# Patient Record
Sex: MALE
[De-identification: names, ages, dates, MRNs, and addresses within clinical notes are randomized per-mention and may not be internally consistent; named-entity substitution may affect disease eponyms.]

---

## 2021-05-10 ENCOUNTER — APPOINTMENT (OUTPATIENT)
Dept: DISASTER EMERGENCY | Facility: OTHER | Age: 31
End: 2021-05-10
Payer: COMMERCIAL

## 2021-05-10 PROCEDURE — 0012A: CPT

## 2022-09-19 PROBLEM — Z00.00 ENCOUNTER FOR PREVENTIVE HEALTH EXAMINATION: Status: ACTIVE | Noted: 2022-09-19

## 2024-03-06 ENCOUNTER — APPOINTMENT (OUTPATIENT)
Dept: OTOLARYNGOLOGY | Facility: CLINIC | Age: 34
End: 2024-03-06
Payer: COMMERCIAL

## 2024-03-06 VITALS
DIASTOLIC BLOOD PRESSURE: 71 MMHG | HEART RATE: 68 BPM | SYSTOLIC BLOOD PRESSURE: 108 MMHG | TEMPERATURE: 98 F | WEIGHT: 160 LBS | HEIGHT: 72 IN | BODY MASS INDEX: 21.67 KG/M2 | OXYGEN SATURATION: 100 %

## 2024-03-06 DIAGNOSIS — H93.299 OTHER ABNORMAL AUDITORY PERCEPTIONS, UNSPECIFIED EAR: ICD-10-CM

## 2024-03-06 DIAGNOSIS — J34.2 DEVIATED NASAL SEPTUM: ICD-10-CM

## 2024-03-06 DIAGNOSIS — Z78.9 OTHER SPECIFIED HEALTH STATUS: ICD-10-CM

## 2024-03-06 DIAGNOSIS — H61.23 IMPACTED CERUMEN, BILATERAL: ICD-10-CM

## 2024-03-06 PROCEDURE — 92550 TYMPANOMETRY & REFLEX THRESH: CPT | Mod: 52

## 2024-03-06 PROCEDURE — 69210 REMOVE IMPACTED EAR WAX UNI: CPT

## 2024-03-06 PROCEDURE — 92557 COMPREHENSIVE HEARING TEST: CPT

## 2024-03-06 PROCEDURE — 31231 NASAL ENDOSCOPY DX: CPT

## 2024-03-06 PROCEDURE — 99203 OFFICE O/P NEW LOW 30 MIN: CPT | Mod: 25

## 2024-03-06 NOTE — HISTORY OF PRESENT ILLNESS
[de-identified] : 32 yo m in loud envts gets a sensation gets scratchy sound in his ears. he also often gets fullness and popping in his ears when he swallows. Over a year. Intermittent. Worse if fatigued. Nonsmoker. Often congested and has broken nose 3x; he is a . L side of nose is closed. No allergies. No fh. No bruxism.

## 2024-03-06 NOTE — PHYSICAL EXAM
[de-identified] : b copious cerumen impaction removed atrauamatically with suction - tms nl [] : septum deviated bilaterally [Nasal Endoscopy Performed] : nasal endoscopy was performed, see procedure section for findings [Normal] : no rashes [de-identified] : gait steady

## 2024-03-06 NOTE — PROCEDURE
[Posterior Lesion] : posterior lesion [Anterior rhinoscopy insufficient to account for symptoms] : anterior rhinoscopy insufficient to account for symptoms [Flexible Endoscope] : examined with the flexible endoscope [Oxymetazoline HCl] : oxymetazoline HCl [Topical Lidocaine] : topical lidocaine [Serial Number: ___] : Serial Number: [unfilled] [S-Shaped Deviated] : S-shape deviation [Normal] : the paranasal sinuses had no abnormalities [Same] : same as the Pre Op Dx. [Cerumen Impaction] : Cerumen Impaction [FreeTextEntry6] : b copious cerumen impaction removed atraumatically with suction [] : Removal of Cerumen

## 2024-03-06 NOTE — ASSESSMENT
[FreeTextEntry1] : cerumen removed ears felt better reassured hearing is nl explained cerumen likely cause of distortion; could also be ligament issue - may resolve - could be other etiology as well - asked ot let us know if continues  deviated septum - can consider flonase can repair; explained procedure - explained he has to be sure he will not break nose again he said he would consider in future - also rec Breathe-Rite strips or cones avoid qtips; debrox if needed rtc as needed